# Patient Record
Sex: MALE
[De-identification: names, ages, dates, MRNs, and addresses within clinical notes are randomized per-mention and may not be internally consistent; named-entity substitution may affect disease eponyms.]

---

## 2024-07-24 PROBLEM — Z00.00 ENCOUNTER FOR PREVENTIVE HEALTH EXAMINATION: Status: ACTIVE | Noted: 2024-07-24

## 2024-07-30 ENCOUNTER — APPOINTMENT (OUTPATIENT)
Dept: NEUROLOGY | Facility: CLINIC | Age: 64
End: 2024-07-30
Payer: COMMERCIAL

## 2024-07-30 VITALS
RESPIRATION RATE: 16 BRPM | SYSTOLIC BLOOD PRESSURE: 128 MMHG | DIASTOLIC BLOOD PRESSURE: 75 MMHG | OXYGEN SATURATION: 98 % | WEIGHT: 75 LBS | HEIGHT: 64 IN | BODY MASS INDEX: 12.8 KG/M2 | HEART RATE: 50 BPM

## 2024-07-30 DIAGNOSIS — Z82.49 FAMILY HISTORY OF ISCHEMIC HEART DISEASE AND OTHER DISEASES OF THE CIRCULATORY SYSTEM: ICD-10-CM

## 2024-07-30 DIAGNOSIS — R53.83 OTHER FATIGUE: ICD-10-CM

## 2024-07-30 DIAGNOSIS — Z78.9 OTHER SPECIFIED HEALTH STATUS: ICD-10-CM

## 2024-07-30 DIAGNOSIS — D51.9 VITAMIN B12 DEFICIENCY ANEMIA, UNSPECIFIED: ICD-10-CM

## 2024-07-30 DIAGNOSIS — G20.A1 PARKINSON'S DISEASE WITHOUT DYSKINESIA, WITHOUT MENTION OF FLUCTUATIONS: ICD-10-CM

## 2024-07-30 DIAGNOSIS — M45.9 ANKYLOSING SPONDYLITIS OF UNSPECIFIED SITES IN SPINE: ICD-10-CM

## 2024-07-30 DIAGNOSIS — D64.9 ANEMIA, UNSPECIFIED: ICD-10-CM

## 2024-07-30 PROCEDURE — 99204 OFFICE O/P NEW MOD 45 MIN: CPT

## 2024-07-30 RX ORDER — CANDESARTAN CILEXETIL 8 MG/1
8 TABLET ORAL DAILY
Refills: 0 | Status: ACTIVE | COMMUNITY

## 2024-07-30 RX ORDER — ETANERCEPT 50 MG/ML
50 SOLUTION SUBCUTANEOUS
Refills: 0 | Status: ACTIVE | COMMUNITY

## 2024-08-06 PROBLEM — R53.83 TIREDNESS: Status: ACTIVE | Noted: 2024-08-06

## 2024-08-06 PROBLEM — G20.A1 PARKINSONS DISEASE: Status: ACTIVE | Noted: 2024-07-30

## 2024-08-06 PROBLEM — M45.9 ANKYLOSING SPONDYLITIS: Status: ACTIVE | Noted: 2024-07-30

## 2024-08-06 PROBLEM — D51.9 ANEMIA DUE TO VITAMIN B12 DEFICIENCY, UNSPECIFIED B12 DEFICIENCY TYPE: Status: ACTIVE | Noted: 2024-08-06

## 2024-08-06 PROBLEM — D64.9 ANEMIA: Status: ACTIVE | Noted: 2024-08-06

## 2024-08-06 RX ORDER — CARBIDOPA AND LEVODOPA 25; 100 MG/1; MG/1
25-100 TABLET, EXTENDED RELEASE ORAL
Refills: 0 | Status: ACTIVE | COMMUNITY

## 2024-08-06 RX ORDER — CARBIDOPA AND LEVODOPA 50; 200 MG/1; MG/1
50-200 TABLET, EXTENDED RELEASE ORAL
Qty: 90 | Refills: 3 | Status: ACTIVE | COMMUNITY
Start: 2024-08-06 | End: 1900-01-01

## 2024-08-06 NOTE — DISCUSSION/SUMMARY
[FreeTextEntry1] : He has relatively mild PD. Will check limited blood work to address his fatigue: TSH and Vit. B12 Will change Sinemet ER of 50/200.

## 2024-08-06 NOTE — HISTORY OF PRESENT ILLNESS
[FreeTextEntry1] : He was diagnosed of PD shortly after Covid-19 pandemic by physician in [facility]. He had generalized slowing and tremor in left upper and lower extremities. He was put on carbido-levodopa. When he took the medication during the day, he had excessive GI side effect. He has changed to nightly pill now with good tolerance. His tremor is controlled. Upon waking up in mornings, he has jaw tightness. Throughout the day, he feels tired a lot. He is most concerned about the fatigue.  Active symptoms:  Soft voice. No real dysphagia. Slowness. Rare tremor in left side. No balance issue. No fall. Denies insomnia, vivid dream or hallucinations. Occasional drooling.  Constipated. Not on any laxatives. Impaired sense to smell.  Denies emotional symptoms or incontinence. Feels somewhat forgetful.  He carries diagnosis of ankylosing spondylitis, managed by rheumatologist. He has active cardiac work up.  Recent blood work on 7/19/24 shows mild anemia with elevated MCH.

## 2024-08-06 NOTE — PHYSICAL EXAM
[General Appearance - Alert] : alert [General Appearance - In No Acute Distress] : in no acute distress [Oriented To Time, Place, And Person] : oriented to person, place, and time [Impaired Insight] : insight and judgment were intact [Affect] : the affect was normal [Person] : oriented to person [Place] : oriented to place [Time] : oriented to time [Concentration Intact] : normal concentrating ability [Visual Intact] : visual attention was ~T not ~L decreased [Naming Objects] : no difficulty naming common objects [Repeating Phrases] : no difficulty repeating a phrase [Writing A Sentence] : no difficulty writing a sentence [Fluency] : fluency intact [Comprehension] : comprehension intact [Reading] : reading intact [Past History] : adequate knowledge of personal past history [Cranial Nerves Optic (II)] : visual acuity intact bilaterally,  visual fields full to confrontation, pupils equal round and reactive to light [Cranial Nerves Oculomotor (III)] : extraocular motion intact [Cranial Nerves Trigeminal (V)] : facial sensation intact symmetrically [Cranial Nerves Facial (VII)] : face symmetrical [Cranial Nerves Vestibulocochlear (VIII)] : hearing was intact bilaterally [Cranial Nerves Glossopharyngeal (IX)] : tongue and palate midline [Cranial Nerves Accessory (XI - Cranial And Spinal)] : head turning and shoulder shrug symmetric [Cranial Nerves Hypoglossal (XII)] : there was no tongue deviation with protrusion [Motor Strength] : muscle strength was normal in all four extremities [No Muscle Atrophy] : normal bulk in all four extremities [Motor Handedness Right-Handed] : the patient is right hand dominant [2] : Walking - 2 [2 - Mild: Loss of modulation, diction, or volume, with a few words unclear, but the overall sentences easy to follow] : Speech - 2 [1 - Slight: Rigidity only detected with activation maneuver] : Rigidity - Lower extremity: right - 1 [2 - Mild: Rigidity detected without the activation maneuver, but full range of motion is easily achieved.] : Rigidity - Lower extremity: left - 2 [2 - Mild: Any of the following: a) 3 to 5 interruptions during tapping; b) mild slowing; c) the amplitude decrements midway in the 10-tap sequence.] : Finger tapping: left - 2 [1 - Slight: Any of the following: a) the regular rhythm is broken with one or two interruptions or hesitations of the movement; b) slight slowing; c) the amplitude decrements near the end of the task.] : Hand movements: right - 1 [2 - Mild: Any of the following: a) 3 to 5 interruptions during the movements; b) mild slowing; c) the amplitude decrements midway in the task.] : Hand movements: left - 2 [1 - Slight: Any of the following: a) the reqular rhytm is broken with one or two interruptions or hesitations of the movement; b) slight slowing; c) the amplitude decrements near the end of the sequence.] : Pronation-supination movements of hands: right - 1 [0 - Normal: No problems] : Toe tapping: right - 0 [1 - Slight: Any of the following: a) the reqular rhythm is broken with one or two interruptions or hesitations of the tapping movement; b) slight slowing; c) the amplitude decrements near the end of the 10 taps.] : Toe tapping: left - 1 [1 - Slight: Any of the following: a) the regular rhythm is broken with one or two interruptions or hesitations of the movement; b) slight slowing; c) amplitude decrements near the end of the task.] : Leg agility: right - 1 [2 - Mild: Any of the following: a) 3 to 5 interruptions during the movements; b) mild slowness; c) amplitude decrements midway in the task.] : Leg agility: left - 2 [1 - Slight: Arising is slower than normal; or may need more than one attempt; or may need to move forward in the chair to arise. No need to use the arms of the chair.] : Arise from chair - 1 [2 - Mild: Independent walking but with substantial gait impairment.] : Gait - 2 [0 - Normal: No freezing] : Freezing of gait- 0 [1 - Slight: 3 - 5 steps, but subject recovers unaided.] : Postural stability - 1 [1 - Slight: Not quite erect, but posture could be normal for older person.] : Posture - 1 [2 - Mild: Mild global slowness and poverty of spontaneous movements.] : Body bradykinesia - 2 [0 - Normal: No tremor.] : Rest tremor amplitude - Lower extremity: right - 0 [1 - Slight: < 1 cm in maximal amplitude] : Rest tremor amplitude - Lower extremity: left - 1 [1 - Slight: Tremor at rest is present <= 25% of the entire examination period.] : Constancy of rest tremor  - 1 [1] : Early morning dystonia - 1 [0] : Symptomatic Orthostasis - 0 [Sensation Tactile Decrease] : light touch was intact [Sensation Pain / Temperature Decrease] : pain and temperature was intact [Sensation Vibration Decrease] : vibration was intact [Balance] : balance was intact [2+] : Patella left 2+ [1+] : Ankle jerk left 1+ [Glabellar Reflex] : the glabellar reflex was present [Extraocular Movements] : extraocular movements were intact [No Spinal Tenderness] : no spinal tenderness [Romberg's Sign] : Romberg's sign was negtive [Allodynia] : no ~T allodynia present [Past-pointing] : there was no past-pointing [Tremor] : no tremor present [Plantar Reflex Right Only] : normal on the right [Plantar Reflex Left Only] : normal on the left [___] : absent on the right [___] : absent on the left [Primitive Reflexes] : primitive reflexes were absent [FreeTextEntry9] : 7/30/24 [FreeTextEntry1] : 1 [FreeTextEntry3] : 7 [FreeTextEntry5] : 30 [FreeTextEntry7] : 37 [de-identified] : Positive Jaw Jerk

## 2024-08-06 NOTE — HISTORY OF PRESENT ILLNESS
[FreeTextEntry1] : He was diagnosed of PD shortly after Covid-19 pandemic by physician in [facility]. He had generalized slowing and tremor in left upper and lower extremities. He was put on carbido-levodopa. When he took the medication during the day, he had excessive GI side effect. He has changed to nightly pill now with good tolerance. His tremor is controlled. Upon waking up in mornings, he has jaw tightness. Throughout the day, he feels tired a lot. He is most concerned about the fatigue.  Active symptoms:  Soft voice. No real dysphagia. Slowness. Rare tremor in left side. No balance issue. No fall. Denies insomnia, vivid dream or hallucinations. Occasional drooling.  Constipated. Not on any laxatives. Impaired sense to smell.  Denies emotional symptoms or incontinence. Feels somewhat forgetful.  He carries diagnosis of ankylosing spondylitis, managed by rheumatologist. He has active cardiac work up.  Recent blood work on 7/19/24 shows mild anemia with elevated MCH.

## 2024-08-06 NOTE — PHYSICAL EXAM
[General Appearance - Alert] : alert [General Appearance - In No Acute Distress] : in no acute distress [Oriented To Time, Place, And Person] : oriented to person, place, and time [Impaired Insight] : insight and judgment were intact [Affect] : the affect was normal [Person] : oriented to person [Place] : oriented to place [Time] : oriented to time [Concentration Intact] : normal concentrating ability [Visual Intact] : visual attention was ~T not ~L decreased [Naming Objects] : no difficulty naming common objects [Repeating Phrases] : no difficulty repeating a phrase [Writing A Sentence] : no difficulty writing a sentence [Fluency] : fluency intact [Comprehension] : comprehension intact [Reading] : reading intact [Past History] : adequate knowledge of personal past history [Cranial Nerves Optic (II)] : visual acuity intact bilaterally,  visual fields full to confrontation, pupils equal round and reactive to light [Cranial Nerves Oculomotor (III)] : extraocular motion intact [Cranial Nerves Trigeminal (V)] : facial sensation intact symmetrically [Cranial Nerves Facial (VII)] : face symmetrical [Cranial Nerves Vestibulocochlear (VIII)] : hearing was intact bilaterally [Cranial Nerves Glossopharyngeal (IX)] : tongue and palate midline [Cranial Nerves Accessory (XI - Cranial And Spinal)] : head turning and shoulder shrug symmetric [Cranial Nerves Hypoglossal (XII)] : there was no tongue deviation with protrusion [Motor Strength] : muscle strength was normal in all four extremities [No Muscle Atrophy] : normal bulk in all four extremities [Motor Handedness Right-Handed] : the patient is right hand dominant [2] : Walking - 2 [2 - Mild: Loss of modulation, diction, or volume, with a few words unclear, but the overall sentences easy to follow] : Speech - 2 [1 - Slight: Rigidity only detected with activation maneuver] : Rigidity - Lower extremity: right - 1 [2 - Mild: Rigidity detected without the activation maneuver, but full range of motion is easily achieved.] : Rigidity - Lower extremity: left - 2 [2 - Mild: Any of the following: a) 3 to 5 interruptions during tapping; b) mild slowing; c) the amplitude decrements midway in the 10-tap sequence.] : Finger tapping: left - 2 [1 - Slight: Any of the following: a) the regular rhythm is broken with one or two interruptions or hesitations of the movement; b) slight slowing; c) the amplitude decrements near the end of the task.] : Hand movements: right - 1 [2 - Mild: Any of the following: a) 3 to 5 interruptions during the movements; b) mild slowing; c) the amplitude decrements midway in the task.] : Hand movements: left - 2 [1 - Slight: Any of the following: a) the reqular rhytm is broken with one or two interruptions or hesitations of the movement; b) slight slowing; c) the amplitude decrements near the end of the sequence.] : Pronation-supination movements of hands: right - 1 [0 - Normal: No problems] : Toe tapping: right - 0 [1 - Slight: Any of the following: a) the reqular rhythm is broken with one or two interruptions or hesitations of the tapping movement; b) slight slowing; c) the amplitude decrements near the end of the 10 taps.] : Toe tapping: left - 1 [1 - Slight: Any of the following: a) the regular rhythm is broken with one or two interruptions or hesitations of the movement; b) slight slowing; c) amplitude decrements near the end of the task.] : Leg agility: right - 1 [2 - Mild: Any of the following: a) 3 to 5 interruptions during the movements; b) mild slowness; c) amplitude decrements midway in the task.] : Leg agility: left - 2 [1 - Slight: Arising is slower than normal; or may need more than one attempt; or may need to move forward in the chair to arise. No need to use the arms of the chair.] : Arise from chair - 1 [2 - Mild: Independent walking but with substantial gait impairment.] : Gait - 2 [0 - Normal: No freezing] : Freezing of gait- 0 [1 - Slight: 3 - 5 steps, but subject recovers unaided.] : Postural stability - 1 [1 - Slight: Not quite erect, but posture could be normal for older person.] : Posture - 1 [2 - Mild: Mild global slowness and poverty of spontaneous movements.] : Body bradykinesia - 2 [0 - Normal: No tremor.] : Rest tremor amplitude - Lower extremity: right - 0 [1 - Slight: < 1 cm in maximal amplitude] : Rest tremor amplitude - Lower extremity: left - 1 [1 - Slight: Tremor at rest is present <= 25% of the entire examination period.] : Constancy of rest tremor  - 1 [1] : Early morning dystonia - 1 [0] : Symptomatic Orthostasis - 0 [Sensation Tactile Decrease] : light touch was intact [Sensation Pain / Temperature Decrease] : pain and temperature was intact [Sensation Vibration Decrease] : vibration was intact [Balance] : balance was intact [2+] : Patella left 2+ [1+] : Ankle jerk left 1+ [Glabellar Reflex] : the glabellar reflex was present [Extraocular Movements] : extraocular movements were intact [No Spinal Tenderness] : no spinal tenderness [Romberg's Sign] : Romberg's sign was negtive [Allodynia] : no ~T allodynia present [Past-pointing] : there was no past-pointing [Tremor] : no tremor present [Plantar Reflex Right Only] : normal on the right [Plantar Reflex Left Only] : normal on the left [___] : absent on the right [___] : absent on the left [Primitive Reflexes] : primitive reflexes were absent [FreeTextEntry9] : 7/30/24 [FreeTextEntry1] : 1 [FreeTextEntry3] : 7 [FreeTextEntry7] : 37 [FreeTextEntry5] : 30 [de-identified] : Positive Jaw Jerk

## 2024-08-06 NOTE — PHYSICAL EXAM
[General Appearance - Alert] : alert [General Appearance - In No Acute Distress] : in no acute distress [Oriented To Time, Place, And Person] : oriented to person, place, and time [Impaired Insight] : insight and judgment were intact [Affect] : the affect was normal [Person] : oriented to person [Place] : oriented to place [Time] : oriented to time [Concentration Intact] : normal concentrating ability [Visual Intact] : visual attention was ~T not ~L decreased [Naming Objects] : no difficulty naming common objects [Repeating Phrases] : no difficulty repeating a phrase [Writing A Sentence] : no difficulty writing a sentence [Fluency] : fluency intact [Comprehension] : comprehension intact [Reading] : reading intact [Past History] : adequate knowledge of personal past history [Cranial Nerves Optic (II)] : visual acuity intact bilaterally,  visual fields full to confrontation, pupils equal round and reactive to light [Cranial Nerves Oculomotor (III)] : extraocular motion intact [Cranial Nerves Trigeminal (V)] : facial sensation intact symmetrically [Cranial Nerves Facial (VII)] : face symmetrical [Cranial Nerves Vestibulocochlear (VIII)] : hearing was intact bilaterally [Cranial Nerves Glossopharyngeal (IX)] : tongue and palate midline [Cranial Nerves Accessory (XI - Cranial And Spinal)] : head turning and shoulder shrug symmetric [Cranial Nerves Hypoglossal (XII)] : there was no tongue deviation with protrusion [Motor Strength] : muscle strength was normal in all four extremities [No Muscle Atrophy] : normal bulk in all four extremities [Motor Handedness Right-Handed] : the patient is right hand dominant [2] : Walking - 2 [2 - Mild: Loss of modulation, diction, or volume, with a few words unclear, but the overall sentences easy to follow] : Speech - 2 [1 - Slight: Rigidity only detected with activation maneuver] : Rigidity - Lower extremity: right - 1 [2 - Mild: Rigidity detected without the activation maneuver, but full range of motion is easily achieved.] : Rigidity - Lower extremity: left - 2 [2 - Mild: Any of the following: a) 3 to 5 interruptions during tapping; b) mild slowing; c) the amplitude decrements midway in the 10-tap sequence.] : Finger tapping: left - 2 [1 - Slight: Any of the following: a) the regular rhythm is broken with one or two interruptions or hesitations of the movement; b) slight slowing; c) the amplitude decrements near the end of the task.] : Hand movements: right - 1 [2 - Mild: Any of the following: a) 3 to 5 interruptions during the movements; b) mild slowing; c) the amplitude decrements midway in the task.] : Hand movements: left - 2 [1 - Slight: Any of the following: a) the reqular rhytm is broken with one or two interruptions or hesitations of the movement; b) slight slowing; c) the amplitude decrements near the end of the sequence.] : Pronation-supination movements of hands: right - 1 [0 - Normal: No problems] : Toe tapping: right - 0 [1 - Slight: Any of the following: a) the reqular rhythm is broken with one or two interruptions or hesitations of the tapping movement; b) slight slowing; c) the amplitude decrements near the end of the 10 taps.] : Toe tapping: left - 1 [1 - Slight: Any of the following: a) the regular rhythm is broken with one or two interruptions or hesitations of the movement; b) slight slowing; c) amplitude decrements near the end of the task.] : Leg agility: right - 1 [2 - Mild: Any of the following: a) 3 to 5 interruptions during the movements; b) mild slowness; c) amplitude decrements midway in the task.] : Leg agility: left - 2 [1 - Slight: Arising is slower than normal; or may need more than one attempt; or may need to move forward in the chair to arise. No need to use the arms of the chair.] : Arise from chair - 1 [2 - Mild: Independent walking but with substantial gait impairment.] : Gait - 2 [0 - Normal: No freezing] : Freezing of gait- 0 [1 - Slight: 3 - 5 steps, but subject recovers unaided.] : Postural stability - 1 [1 - Slight: Not quite erect, but posture could be normal for older person.] : Posture - 1 [2 - Mild: Mild global slowness and poverty of spontaneous movements.] : Body bradykinesia - 2 [0 - Normal: No tremor.] : Rest tremor amplitude - Lower extremity: right - 0 [1 - Slight: < 1 cm in maximal amplitude] : Rest tremor amplitude - Lower extremity: left - 1 [1 - Slight: Tremor at rest is present <= 25% of the entire examination period.] : Constancy of rest tremor  - 1 [1] : Early morning dystonia - 1 [0] : Symptomatic Orthostasis - 0 [Sensation Tactile Decrease] : light touch was intact [Sensation Pain / Temperature Decrease] : pain and temperature was intact [Sensation Vibration Decrease] : vibration was intact [Balance] : balance was intact [2+] : Patella left 2+ [1+] : Ankle jerk left 1+ [Glabellar Reflex] : the glabellar reflex was present [Extraocular Movements] : extraocular movements were intact [No Spinal Tenderness] : no spinal tenderness [Romberg's Sign] : Romberg's sign was negtive [Allodynia] : no ~T allodynia present [Past-pointing] : there was no past-pointing [Tremor] : no tremor present [Plantar Reflex Right Only] : normal on the right [Plantar Reflex Left Only] : normal on the left [___] : absent on the right [___] : absent on the left [Primitive Reflexes] : primitive reflexes were absent [FreeTextEntry9] : 7/30/24 [FreeTextEntry1] : 1 [FreeTextEntry3] : 7 [FreeTextEntry7] : 37 [FreeTextEntry5] : 30 [de-identified] : Positive Jaw Jerk

## 2024-08-06 NOTE — HISTORY OF PRESENT ILLNESS
[FreeTextEntry1] : He was diagnosed of PD shortly after Covid-19 pandemic by physician in [facility]. He had generalized slowing and tremor in left upper and lower extremities. He was put on carbido-levodopa. When he took the medication during the day, he had excessive GI side effect. He has changed to nightly pill now with good tolerance. His tremor is controlled. Upon waking up in mornings, he has jaw tightness. Throughout the day, he feels tired a lot. He is most concerned about the fatigue.  Active symptoms:  Soft voice. No real dysphagia. Slowness. Rare tremor in left side. No balance issue. No fall. Denies insomnia, vivid dream or hallucinations. Occasional drooling.  Constipated. Not on any laxatives. Impaired sense to smell.  Denies emotional symptoms or incontinence. Feels somewhat forgetful.  He carries diagnosis of ankylosing spondylitis, managed by rheumatologist. He has active cardiac work up.  Recent blood work on 7/19/24 shows mild anemia with elevated MCH.